# Patient Record
Sex: FEMALE | Race: WHITE
[De-identification: names, ages, dates, MRNs, and addresses within clinical notes are randomized per-mention and may not be internally consistent; named-entity substitution may affect disease eponyms.]

---

## 2017-02-10 ENCOUNTER — HOSPITAL ENCOUNTER (EMERGENCY)
Dept: HOSPITAL 74 - JERFT | Age: 27
Discharge: HOME | End: 2017-02-10
Payer: COMMERCIAL

## 2017-02-10 VITALS — DIASTOLIC BLOOD PRESSURE: 71 MMHG | HEART RATE: 75 BPM | SYSTOLIC BLOOD PRESSURE: 123 MMHG | TEMPERATURE: 98 F

## 2017-02-10 VITALS — BODY MASS INDEX: 21.7 KG/M2

## 2017-02-10 DIAGNOSIS — B97.89: ICD-10-CM

## 2017-02-10 DIAGNOSIS — J06.9: Primary | ICD-10-CM

## 2017-02-10 NOTE — PDOC
History of Present Illness





- General


Chief Complaint: Cold Symptoms


Stated Complaint: FEVER,CONGESTED,COUGHING,HEADACHES


Time Seen by Provider: 02/10/17 11:00


History Source: Patient


Exam Limitations: No Limitations





- History of Present Illness


Initial Comments: 





02/10/17 12:17


26 yr female with c/o runny nose, congestion, and had fever 3 days ago. No 

fever now.





Past History





- Past Medical History


Allergies/Adverse Reactions: 


 Allergies











Allergy/AdvReac Type Severity Reaction Status Date / Time


 


No Known Allergies Allergy   Verified 02/10/17 11:01











Home Medications: 


Ambulatory Orders





NK [No Known Home Medication]  02/10/17 








Other medical history: none





- Immunization History


Immunization Up to Date: Yes





- Psycho/Social/Smoking Cessation Hx


Anxiety: No


Suicidal Ideation: No


Smoking Status: No


Smoking History: Never smoked


Number of Cigarettes Smoked Daily: 0


Information on smoking cessation initiated: No


Hx Alcohol Use: No


Drug/Substance Use Hx: No


Substance Use Type: Alcohol





Respiratory Specific PMHX





- Complaint Specific PMHX


Angina: No


Bronchitis: No


Pneumonia: No


Pulmonary Embolus: No


TB (Tuberculosis): No





**Review of Systems





- Review of Systems


Able to Perform ROS?: Yes


Is the patient limited English proficient: No


Constitutional: Yes: Symptoms Reported, See HPI


HEENTM: Yes: Symptoms Reported, See HPI


Respiratory: Yes: See HPI, Cough





*Physical Exam





- Vital Signs


 Last Vital Signs











Temp Pulse Resp BP Pulse Ox


 


 98.0 F   75   18   123/71   100 


 


 02/10/17 10:41  02/10/17 10:41  02/10/17 10:41  02/10/17 10:41  02/10/17 10:41














- Physical Exam


General Appearance: Yes: Nourished, Appropriately Dressed


HEENT: positive: EOMI, RIGO, Pharynx Normal, Nasal Congestion, Rhinorrhea


Neck: negative: Tender


Respiratory/Chest: positive: Lungs Clear, Normal Breath Sounds.  negative: 

Chest Tender


Cardiovascular: positive: Regular Rhythm, Regular Rate


Gastrointestinal/Abdominal: positive: Normal Bowel Sounds, Soft


Musculoskeletal: positive: Normal Inspection


Extremity: positive: Normal Capillary Refill, Normal Inspection, Normal Range 

of Motion


Integumentary: positive: Normal Color, Dry, Warm


Neurologic: positive: Fully Oriented, Alert, Normal Mood/Affect, Normal Response

, Motor Strength 5/5





ED Treatment Course





- ADDITIONAL ORDERS


Additional order review: 














 02/10/17 11:41 Influenza Types A,B Antigen (DAVID) - Final





 Nasopharyngeal Swab  - Final


 


 02/10/17 11:41 Group A Strep Rapid Antigen - Final





 Throat 














Medical Decision Making





- Medical Decision Making





02/10/17 15:53


cc: runny nose, congestion 


sent by her job for flu test. pt works in .


pt denies body aches or chills, states she had fever 3 days ago





*DC/Admit/Observation/Transfer


Diagnosis at time of Disposition: 


 Upper respiratory infection, viral





- Discharge Dispostion


Disposition: HOME


Condition at time of disposition: Good





- Referrals


Referrals: 


Xavi Ferraro [Primary Care Provider] - 





- Patient Instructions


Additional Instructions: 


drink pleanty of fluids to stay hydrated


take over the counter Delsym or Mucinex for cough 


follow with your doctor if any worsening symptoms





- Post Discharge Activity


Work/School Note:  Back to Work

## 2017-05-29 ENCOUNTER — HOSPITAL ENCOUNTER (EMERGENCY)
Dept: HOSPITAL 74 - JERFT | Age: 27
Discharge: HOME | End: 2017-05-29
Payer: COMMERCIAL

## 2017-05-29 VITALS — DIASTOLIC BLOOD PRESSURE: 84 MMHG | TEMPERATURE: 98.2 F | HEART RATE: 57 BPM | SYSTOLIC BLOOD PRESSURE: 132 MMHG

## 2017-05-29 VITALS — BODY MASS INDEX: 21.7 KG/M2

## 2017-05-29 DIAGNOSIS — A69.0: Primary | ICD-10-CM

## 2017-05-29 NOTE — PDOC
History of Present Illness





- General


Chief Complaint: Pain


Stated Complaint: MOUTH SORE


Time Seen by Provider: 05/29/17 12:49


History Source: Patient


Exam Limitations: No Limitations





- History of Present Illness


Initial Comments: 





05/29/17 13:05


Patient is a 26-year-old female history of genital herpes, patient presents 

with canker to lower inner lip, patient also feels small bumps starting to 

erupt to upper lip.  Patient denies any fever, requesting medication.





Past Medical History: Denies.  





Allergies: No known allergies





Medications: none





Family History: Non-contributory





Social History: Denies smoking, alcohol use, or IVDU





Review of Systems


GENERAL/CONSTITUTIONAL: No fever or chills. No weakness. No weight change.


HEAD, EYES, EARS, NOSE AND THROAT: No change in vision. No ear pain or 

discharge. No sore throat. 


CARDIOVASCULAR: No chest pain or shortness of breath.


RESPIRATORY: No cough, wheezing, or hemoptysis.


GASTROINTESTINAL: No nausea, vomiting, diarrhea or constipation. No rectal 

bleeding.


GENITOURINARY: No dysuria, frequency, or change in urination.


MUSCULOSKELETAL: No joint or muscle swelling or pain. No neck or back pain.


SKIN : No rash or easy bruising. Small tiny macules to upper lip





Physical Exam: 


GENERAL: The patient is awake, alert, and fully oriented, in no acute distress.


EYES: Pupils equal, round and reactive to light, extraocular movements intact, 

sclera anicteric, conjunctiva clear.


ENT: Ears normal, nares patent, oropharynx clear without exudates.  Moist 

mucous membranes. No uvula deviation. Single canker noted to right internal 

lower lip. 


NECK: Normal range of motion, supple without lymphadenopathy, JVD, or masses.


LUNGS: Breath sounds equal, clear to auscultation bilaterally.  No wheezes, and 

no crackles.


HEART: Regular rate and rhythm, normal S1 and S2 without murmur, rub or gallop.


ABDOMEN: Soft, nontender, normoactive bowel sounds.  No guarding, no rebound.  

No masses. No bruising or abrasions


MUSCULOSKELETAL: Normal range of motion, no edema.  No clubbing or cyanosis. No 

cords, erythema, or tenderness.


NEUROLOGICAL: Cranial nerves II through XII grossly intact.  Normal speech, 

normal gait.


SKIN: Warm, Dry, normal turgor, no rashes or lesions noted. Small macules to 

upper lip





Past History





- Past Medical History


Allergies/Adverse Reactions: 


 Allergies











Allergy/AdvReac Type Severity Reaction Status Date / Time


 


No Known Allergies Allergy   Verified 05/29/17 12:01











Home Medications: 


Ambulatory Orders





Acyclovir [Zovirax -] 400 mg PO TID #30 capsule 05/29/17 








Other medical history: none





- Immunization History


Immunization Up to Date: Yes





- Psycho/Social/Smoking Cessation Hx


Anxiety: No


Suicidal Ideation: No


Smoking Status: No


Smoking History: Never smoked


Have you smoked in the past 12 months: No


Number of Cigarettes Smoked Daily: 0


Information on smoking cessation initiated: No


Hx Alcohol Use: No


Drug/Substance Use Hx: No


Substance Use Type: Alcohol





*Physical Exam





- Vital Signs


 Last Vital Signs











Temp Pulse Resp BP Pulse Ox


 


 98.2 F   57 L  18   132/84   100 


 


 05/29/17 12:02  05/29/17 12:02  05/29/17 12:02  05/29/17 12:02  05/29/17 12:02














Medical Decision Making





- Medical Decision Making





05/29/17 13:10


A/P: Patient with canker to oral cavity, history of genital herpes also history 

of oral herpetic lesions. We'll DC patient on azithromycin, should use over-the-

counter topical for pain relief such as Dilantin or breathing, she verbalized 

understanding will follow-up as directed.





*DC/Admit/Observation/Transfer


Diagnosis at time of Disposition: 


 Chancre





- Discharge Dispostion


Disposition: HOME


Condition at time of disposition: Good


Admit: No





- Prescriptions


Prescriptions: 


Acyclovir [Zovirax -] 400 mg PO TID #30 capsule





- Referrals


Referrals: 


Adalid Ferraro MD [Primary Care Provider] - 





- Patient Instructions


Additional Instructions: 


Good oral care.


Abreva, zilactin or over the counter chancre sore medicine.

## 2017-07-20 ENCOUNTER — HOSPITAL ENCOUNTER (EMERGENCY)
Dept: HOSPITAL 74 - JERFT | Age: 27
Discharge: HOME | End: 2017-07-20
Payer: COMMERCIAL

## 2017-07-20 VITALS — BODY MASS INDEX: 21 KG/M2

## 2017-07-20 VITALS — DIASTOLIC BLOOD PRESSURE: 80 MMHG | HEART RATE: 60 BPM | SYSTOLIC BLOOD PRESSURE: 110 MMHG | TEMPERATURE: 98 F

## 2017-07-20 DIAGNOSIS — J02.9: Primary | ICD-10-CM

## 2017-07-20 NOTE — PDOC
History of Present Illness





- General


Chief Complaint: Sore Throat


Stated Complaint: SORE THROAT


Time Seen by Provider: 07/20/17 16:26


History Source: Patient


Exam Limitations: No Limitations





- History of Present Illness


Initial Comments: 





07/20/17 16:43


26 yr female with c/o sore throat for 4-5 days. no fever or chills no cough no 

vomiting . no medical history or allergies. 





Past History





- Past Medical History


Allergies/Adverse Reactions: 


 Allergies











Allergy/AdvReac Type Severity Reaction Status Date / Time


 


No Known Allergies Allergy   Verified 07/20/17 16:23











Home Medications: 


Ambulatory Orders





NK [No Known Home Medication]  07/20/17 








Other medical history: denies





- Immunization History


Immunization Up to Date: Yes





- Psycho/Social/Smoking Cessation Hx


Anxiety: No


Suicidal Ideation: No


Smoking Status: No


Smoking History: Never smoked


Have you smoked in the past 12 months: No


Number of Cigarettes Smoked Daily: 0


Information on smoking cessation initiated: No


Hx Alcohol Use: No


Drug/Substance Use Hx: No


Substance Use Type: None





**Review of Systems





- Review of Systems


Able to Perform ROS?: Yes


Is the patient limited English proficient: No


Constitutional: No: Symptoms Reported


HEENTM: Yes: Symptoms Reported, See HPI


Respiratory: No: Symptoms reported


Cardiac (ROS): No: Symptoms Reported


ABD/GI: No: Symptoms Reported


: No: Symptoms Reported


Musculoskeletal: No: Symptoms Reported


Integumentary: No: Symptoms Reported


Neurological: No: Symptoms reported





*Physical Exam





- Vital Signs


 Last Vital Signs











Temp Pulse Resp BP Pulse Ox


 


 98 F   60   18   110/80   99 


 


 07/20/17 16:22  07/20/17 16:22  07/20/17 16:22  07/20/17 16:22  07/20/17 16:22














- Physical Exam


General Appearance: Yes: Nourished, Appropriately Dressed


HEENT: positive: EOMI, RIGO, TMs Normal, Pharyngeal Erythema.  negative: 

Tonsillar Exudate, Tonsillar Erythema


Neck: positive: Supple.  negative: Tender


Respiratory/Chest: positive: Lungs Clear, Normal Breath Sounds.  negative: 

Chest Tender


Cardiovascular: positive: Regular Rhythm, Regular Rate


Gastrointestinal/Abdominal: positive: Normal Bowel Sounds, Soft


Musculoskeletal: positive: Normal Inspection


Extremity: positive: Normal Inspection, Normal Range of Motion


Integumentary: positive: Normal Color, Dry, Warm


Neurologic: positive: Fully Oriented, Alert, Normal Mood/Affect, Normal Response

, Motor Strength 5/5





Medical Decision Making





- Medical Decision Making


07/20/17 17:08


cc: sore throat no fever


non toxic well appearing 


will r/o strep 


pt taking dayquil and nyquil as needed 





07/20/17 17:16








07/20/17 17:34


negative strep 








*DC/Admit/Observation/Transfer


Diagnosis at time of Disposition: 


Pharyngitis


Qualifiers:


 Pharyngitis/tonsillitis etiology: unspecified etiology Qualified Code(s): 

J02.9 - Acute pharyngitis, unspecified





- Discharge Dispostion


Disposition: HOME


Condition at time of disposition: Good





- Patient Instructions


Additional Instructions: 


gargle with warm salt water 4-5 times a day 


take motrin (over the counter advil, ibuprofen, motrin ) as needed for pain 


follow with your doctor if any worsening symptoms

## 2017-09-13 ENCOUNTER — HOSPITAL ENCOUNTER (EMERGENCY)
Dept: HOSPITAL 74 - JERFT | Age: 27
Discharge: HOME | End: 2017-09-13
Payer: COMMERCIAL

## 2017-09-13 VITALS — DIASTOLIC BLOOD PRESSURE: 74 MMHG | SYSTOLIC BLOOD PRESSURE: 141 MMHG | HEART RATE: 81 BPM | TEMPERATURE: 98.7 F

## 2017-09-13 VITALS — BODY MASS INDEX: 21 KG/M2

## 2017-09-13 DIAGNOSIS — J20.9: Primary | ICD-10-CM

## 2017-09-13 PROCEDURE — 3E0F7GC INTRODUCTION OF OTHER THERAPEUTIC SUBSTANCE INTO RESPIRATORY TRACT, VIA NATURAL OR ARTIFICIAL OPENING: ICD-10-PCS

## 2017-09-13 NOTE — PDOC
History of Present Illness





- General


Chief Complaint: Cold Symptoms


Stated Complaint: WHEEZING


Time Seen by Provider: 09/13/17 09:01


History Source: Patient


Exam Limitations: No Limitations





- History of Present Illness


Initial Comments: 





09/13/17 09:53


Patient is a 26-year-old female, no significant medical history currently on no 

medication reports with 2 week history of cough, wheezing, productive green 

sputum.  Patient states cough is worse at night.  Bronchospasm, no hemoptysis





Past Medical History: Asthma as a child.  





Allergies: No known allergies





Medications: None





Family History: Non-contributory





Social History: Denies smoking, alcohol use, or IVDU





Review of Systems


GENERAL/CONSTITUTIONAL: No fever or chills. No weakness. No weight change.


HEAD, EYES, EARS, NOSE AND THROAT: No change in vision. No ear pain or 

discharge. No sore throat. 


CARDIOVASCULAR: No chest pain or shortness of breath.


RESPIRATORY: Cough and wheezing no hemoptysis


GASTROINTESTINAL: No nausea, vomiting, diarrhea or constipation. No rectal 

bleeding.


GENITOURINARY: No dysuria, frequency, or change in urination.


MUSCULOSKELETAL: No joint or muscle swelling or pain. No neck or back pain.


SKIN : No rash or easy bruising.





Physical Exam: 


GENERAL: The patient is awake, alert, and fully oriented, in no acute distress.


EYES: Pupils equal, round and reactive to light, extraocular movements intact, 

sclera anicteric, conjunctiva clear.


ENT: Ears normal, nares patent, oropharynx clear without exudates.  Moist 

mucous membranes. No uvula deviation


NECK: Normal range of motion, supple without lymphadenopathy, JVD, or masses.


LUNGS: Breath sounds decreased at the bases, expiratory wheezing, no rhonchi.  

no crackles.


HEART: Regular rate and rhythm, normal S1 and S2 without murmur, rub or gallop.


ABDOMEN: Soft, nontender, normoactive bowel sounds.  No guarding, no rebound.  

No masses. No bruising or abrasions


MUSCULOSKELETAL: Normal range of motion, no edema.  No clubbing or cyanosis. No 

cords, erythema, or tenderness. No CVA Tenderness with fist.


SKIN: Warm, Dry, normal turgor, no rashes or lesions noted.





Past History





- Past Medical History


Allergies/Adverse Reactions: 


 Allergies











Allergy/AdvReac Type Severity Reaction Status Date / Time


 


No Known Allergies Allergy   Verified 09/13/17 08:54











Home Medications: 


Ambulatory Orders





Albuterol Sulfate Inhaler - [Ventolin HFA Inhaler -] 1 - 2 inh PO Q4H #1 

inhaler 09/13/17 


Azithromycin [Zithromax 250mg Tablets -] 250 mg PO UTDICT #6 tab 09/13/17 


Prednisone [Deltasone -] 20 mg PO DAILY #5 tablet 09/13/17 








Asthma: Yes (AS A CHILD)





- Immunization History


Immunization Up to Date: Yes





- Psycho/Social/Smoking Cessation Hx


Anxiety: No


Suicidal Ideation: No


Smoking Status: No


Smoking History: Never smoked


Have you smoked in the past 12 months: No


Number of Cigarettes Smoked Daily: 0


Hx Alcohol Use: Yes (SOCIAL)


Drug/Substance Use Hx: No


Substance Use Type: None





Respiratory Specific PMHX





- Complaint Specific PMHX


Angina: No


Bronchitis: No


Pneumonia: No


Pulmonary Embolus: No


TB (Tuberculosis): No





*Physical Exam





- Vital Signs


 Last Vital Signs











Temp Pulse Resp BP Pulse Ox


 


 98.7 F   81   20   141/74   98 


 


 09/13/17 08:52  09/13/17 08:52  09/13/17 08:52  09/13/17 08:52  09/13/17 08:52














ED Treatment Course





- Medications


Given in the ED: 


ED Medications














Discontinued Medications














Generic Name Dose Route Start Last Admin





  Trade Name Hajaq  PRN Reason Stop Dose Admin


 


Albuterol/Ipratropium  1 amp 09/13/17 09:20 09/13/17 09:46





  Duoneb -  NEB 09/13/17 09:21  1 amp





  ONCE ONE   Administration


 


Prednisone  40 mg 09/13/17 09:21 09/13/17 09:46





  Deltasone -  PO 09/13/17 09:22  40 mg





  ONCE ONE   Administration














Medical Decision Making





- Medical Decision Making





09/13/17 10:01


A/P: Patient here with URI for 2 weeks, bronchospasm, productive cough. 

Expiratory wheezes noted upon assessment with significant bronchospasm 

prednisone 40 mg by mouth and Combivent given with good result. We will DC 

patient on azithromycin, albuterol and prednisone due to longevity of illness 

and productive nature of cough.


Patient reassessed after Combivent treatment, lungs are clear patient with 

decreased bronchospasm.  O2 sats are 100%.





I discussed the physical exam findings, ancillary test results and final 

diagnoses with the patient. I answered all of the patient's questions.


The patient was satisfied with the care received and felt comfortable with the 

discharge plan and treatment plan. 


The patient will call  to arrange follow-up and will return to the Emergency 

Department with any new, persistent or worsening symptoms. 





*DC/Admit/Observation/Transfer


Diagnosis at time of Disposition: 


 Bronchitis





URI (upper respiratory infection)


Qualifiers:


 URI type: unspecified URI Qualified Code(s): J06.9 - Acute upper respiratory 

infection, unspecified





- Discharge Dispostion


Disposition: HOME


Condition at time of disposition: Good


Admit: No





- Prescriptions


Prescriptions: 


Prednisone [Deltasone -] 20 mg PO DAILY #5 tablet


Albuterol Sulfate Inhaler - [Ventolin HFA Inhaler -] 1 - 2 inh PO Q4H #1 inhaler


Azithromycin [Zithromax 250mg Tablets -] 250 mg PO UTDICT #6 tab





- Referrals


Referrals: 


Adalid Ferraro MD [Primary Care Provider] - 





- Patient Instructions


Printed Discharge Instructions:  DI for Acute Bronchitis


Additional Instructions: 


Keep head of bed elevated 45 when sleeping


Inhaler every 4 hours as needed


Cool air humidifier at night


Motrin for fever greater than 101


Followup in the primary care doctor's office in 2 days for evaluation.


If any respiratory distress, increased cough, inability to drink, increased 

wheezing please return immediately to emergency department.

## 2017-10-02 ENCOUNTER — HOSPITAL ENCOUNTER (EMERGENCY)
Dept: HOSPITAL 74 - JERFT | Age: 27
Discharge: HOME | End: 2017-10-02
Payer: COMMERCIAL

## 2017-10-02 VITALS — TEMPERATURE: 98.2 F | DIASTOLIC BLOOD PRESSURE: 87 MMHG | SYSTOLIC BLOOD PRESSURE: 130 MMHG | HEART RATE: 83 BPM

## 2017-10-02 VITALS — BODY MASS INDEX: 21.7 KG/M2

## 2017-10-02 DIAGNOSIS — N30.01: Primary | ICD-10-CM

## 2017-10-02 LAB
APPEARANCE UR: (no result)
BACTERIA #/AREA URNS HPF: (no result) /HPF
BILIRUB UR STRIP.AUTO-MCNC: NEGATIVE MG/DL
COLOR UR: YELLOW
KETONES UR QL STRIP: (no result)
LEUKOCYTE ESTERASE UR QL STRIP.AUTO: (no result)
MUCOUS THREADS URNS QL MICRO: (no result)
NITRITE UR QL STRIP: NEGATIVE
PH UR: 6 [PH] (ref 5–8)
PROT UR QL STRIP: (no result)
PROT UR QL STRIP: NEGATIVE
RBC # BLD AUTO: 11 /HPF (ref 0–3)
RBC # UR STRIP: NEGATIVE /UL
SP GR UR: 1.02 (ref 1–1.02)
UROBILINOGEN UR STRIP-MCNC: NEGATIVE MG/DL (ref 0.2–1)
WBC # UR AUTO: 10 /HPF (ref 3–5)

## 2017-10-02 NOTE — PDOC
History of Present Illness





- General


Chief Complaint: Urinary Problem


Stated Complaint: URINARY PROBLEM


Time Seen by Provider: 10/02/17 18:02


History Source: Patient


Exam Limitations: No Limitations





- History of Present Illness


Initial Comments: 


10/02/17 18:32


My chief complaint: Pain at end of urination, frequency, urgency last 3-4 days








History of present illness: Patient is a 26 year old female with history of 

asthma here today complaining of dysuria frequency urinating smaller amounts 

with urgency and tiny amount of blood noted on tissue when wiping last 3-4 

days. Patient denies any fever, nausea, vomiting, or any back pain or any 

abdominal pain except for suprapubic tenderness at end of urination. Patient 

denies any vaginal discharge.





10/02/17 18:36








10/02/17 18:36





Timing/Duration: getting worse, intermittent


Severity: mild


Associated Symptoms: reports: denies symptoms





Past History





- Past Medical History


Allergies/Adverse Reactions: 


 Allergies











Allergy/AdvReac Type Severity Reaction Status Date / Time


 


No Known Allergies Allergy   Verified 10/02/17 17:10











Home Medications: 


Ambulatory Orders





Phenazopyridine HCl [Pyridium] 200 mg PO TID #6 tablet 10/02/17 


Sulfamethoxazole/Trimethoprim [Bactrim Ds -] 1 tab PO BID #6 tablet 10/02/17 








Asthma: Yes (AS A CHILD)





- Immunization History


Immunization Up to Date: Yes





- Suicide/Smoking/Psychosocial Hx


Smoking Status: No


Smoking History: Never smoked


Have you smoked in the past 12 months: No


Number of Cigarettes Smoked Daily: 0


Information on smoking cessation initiated: No


Hx Alcohol Use: No


Drug/Substance Use Hx: No


Substance Use Type: None





**Review of Systems





- Review of Systems


Able to Perform ROS?: Yes


Constitutional: No: Symptoms Reported


HEENTM: No: Symptoms Reported


Respiratory: No: Symptoms reported


Cardiac (ROS): No: Symptoms Reported


ABD/GI: Yes: Symptoms Reported, Other (suprapubic tenderness at end of urination

)


: Yes: Symptoms Reported, Dysuria, Frequency, Hematuria (when wiping), 

Urgency.  No: Flank Pain


Musculoskeletal: No: Symptoms Reported


Integumentary: No: Symptoms Reported


Neurological: No: Symptoms reported





*Physical Exam





- Vital Signs


 Last Vital Signs











Temp Pulse Resp BP Pulse Ox


 


 98.2 F   83   14   130/87   100 


 


 10/02/17 17:10  10/02/17 17:10  10/02/17 17:10  10/02/17 17:10  10/02/17 17:10














- Physical Exam


General Appearance: Yes: Appropriately Dressed


Respiratory/Chest: positive: Lungs Clear, Normal Breath Sounds.  negative: 

Chest Tender, Respiratory Distress


Cardiovascular: positive: Regular Rhythm, Regular Rate, S1, S2


Gastrointestinal/Abdominal: positive: Normal Bowel Sounds, Soft.  negative: 

Tender, Organomegaly, Increased Bowel Sounds, Decreased BS, Distended, Guarding

, Rebound, Tenderness, Hepatomegaly, Spleenomegaly


Musculoskeletal: negative: CVA Tenderness, CVA Tenderness (R), CVA Tenderness (L

)


Integumentary: positive: Normal Color





ED Treatment Course





- ADDITIONAL ORDERS


Additional order review: 


 Laboratory  Results











  10/02/17





  17:30


 


Urine Color  Yellow


 


Urine Appearance  Cloudy


 


Urine pH  6.0


 


Urine Protein  1+ H


 


Urine Glucose (UA)  Negative


 


Urine Ketones  Trace H


 


Urine Blood  Negative


 


Urine Nitrite  Negative


 


Urine Bilirubin  Negative


 


Urine Urobilinogen  Negative


 


Urine RBC  11


 


Urine WBC  10


 


Ur Epithelial Cells  Many


 


Urine Bacteria  Rare


 


Urine Mucus  Few


 


Urine HCG, Qual  Negative














Medical Decision Making





- Medical Decision Making


10/02/17 18:36


Patient is a 26 year old female with history of asthma here today complaining 

of dysuria frequency urinating smaller amounts with urgency and tiny amount of 

blood noted on tissue when wiping last 3-4 days. Patient denies any fever, 

nausea, vomiting, or any back pain or any abdominal pain except for suprapubic 

tenderness at end of urination. Patient denies any vaginal discharge. She 

reports being sexually active with the same person does not want to be treated 

prophylactically for any possible STDs.








r/o UTI


cystitis 


r/o chlamydia/GC 














PLAN: 





pyridium 200 mg po tid for 2 days


bactrim DS 1 tab bid for 3 days 


urine hcg negative 


urinalysis 


urine C & S 


chlamydia/GC amplification 








 Laboratory Tests











  10/02/17





  17:30


 


Urine Color  Yellow


 


Urine Appearance  Cloudy


 


Urine pH  6.0


 


Ur Specific Gravity  Pending


 


Urine Protein  1+ H


 


Urine Glucose (UA)  Negative


 


Urine Ketones  Trace H


 


Urine Blood  Negative


 


Urine Nitrite  Negative


 


Urine Bilirubin  Negative


 


Urine Urobilinogen  Negative


 


Urine RBC  11


 


Urine WBC  10


 


Ur Epithelial Cells  Many


 


Urine Bacteria  Rare


 


Urine Mucus  Few


 


Urine HCG, Qual  Negative














10/02/17 18:38








*DC/Admit/Observation/Transfer


Diagnosis at time of Disposition: 


 Cystitis





- Discharge Dispostion


Disposition: HOME


Condition at time of disposition: Stable





- Patient Instructions


Additional Instructions: 








Follow-up with your primary care provider for repeat urine testing at end of 

treatment











Drink a lot a fluids especially cranberry juice








Return to emergency room if symptoms worsen any chills, nausea, vomiting, fever 

or back pain











Patient voiced understanding of discharge instructions and all questions were 

answered

## 2020-02-12 ENCOUNTER — HOSPITAL ENCOUNTER (EMERGENCY)
Dept: HOSPITAL 74 - JERFT | Age: 30
Discharge: HOME | End: 2020-02-12
Payer: COMMERCIAL

## 2020-02-12 VITALS — DIASTOLIC BLOOD PRESSURE: 81 MMHG | TEMPERATURE: 99.7 F | HEART RATE: 118 BPM | SYSTOLIC BLOOD PRESSURE: 131 MMHG

## 2020-02-12 VITALS — BODY MASS INDEX: 19.8 KG/M2

## 2020-02-12 DIAGNOSIS — B97.89: ICD-10-CM

## 2020-02-12 DIAGNOSIS — J45.909: ICD-10-CM

## 2020-02-12 DIAGNOSIS — J06.9: Primary | ICD-10-CM

## 2020-02-12 NOTE — PDOC
History of Present Illness





- General


Chief Complaint: Respiratory


Stated Complaint: Cold Symptoms


Time Seen by Provider: 02/12/20 12:21





- History of Present Illness


Initial Comments: 





02/12/20 12:31


29-year-old female presents for flulike symptoms x3 days





Past History





- Past Medical History


Allergies/Adverse Reactions: 


 Allergies











Allergy/AdvReac Type Severity Reaction Status Date / Time


 


No Known Allergies Allergy   Verified 02/12/20 12:12











Home Medications: 


Ambulatory Orders





Phenazopyridine HCl [Pyridium] 200 mg PO TID #6 tablet 10/02/17 


Sulfamethoxazole/Trimethoprim [Bactrim Ds -] 1 tab PO BID #6 tablet 10/02/17 








Asthma: Yes (AS A CHILD)


COPD: No





- Immunization History


Immunization Up to Date: Yes





- Psycho Social/Smoking Cessation Hx


Smoking Status: No


Smoking History: Never smoked


Have you smoked in the past 12 months: No


Number of Cigarettes Smoked Daily: 0


Hx Alcohol Use: No


Drug/Substance Use Hx: No


Substance Use Type: None





**Review of Systems





- Review of Systems


Constitutional: Yes: Chills, Fever, Malaise, Night Sweats


HEENTM: Yes: Nose Congestion, Throat Pain


Respiratory: Yes: Cough





*Physical Exam





- Vital Signs


 Last Vital Signs











Temp Pulse Resp BP Pulse Ox


 


 99.7 F H  118 H  16   131/81   96 


 


 02/12/20 12:09  02/12/20 12:09  02/12/20 12:09  02/12/20 12:09  02/12/20 12:09














- Physical Exam





02/12/20 12:31


GENERAL: The patient is awake, alert, and fully oriented, in no acute distress.


HEAD: Normal with no signs of trauma.


EYES:  sclera anicteric, conjunctiva clear.


ENT: Ears normal tympanic membranes normal oropharynx clear uvula midline


NECK: Normal range of motion


LUNGS: Breath sounds equal, clear to auscultation bilaterally.  No wheezes, and 

no crackles.


HEART: S1 and S2 without murmur, rub or gallop.


ABDOMEN: Soft, nontender, normoactive bowel sounds.  No guarding, no rebound.  

No masses.


EXTREMITIES: Normal range of motion, no edema.  No clubbing or cyanosis. No 

cords, erythema, or tenderness.


NEUROLOGICAL: Cranial nerves II through XII grossly intact.


PSYCH: Normal mood, normal affect.


SKIN: Warm, Dry, normal turgor, no rashes or lesions noted.





Medical Decision Making





- Medical Decision Making





02/12/20 12:31


Supportive care for presumptive influenza out of the window for Tamiflu





Discharge





- Discharge Information


Problems reviewed: Yes


Clinical Impression/Diagnosis: 


 Upper respiratory infection, viral





Condition: Stable


Disposition: HOME





- Admission


No





- Follow up/Referral


Referrals: 


Bert Muir MD [Staff Physician] - 





- Patient Discharge Instructions


Additional Instructions: 


Supportive care.  Maintain hydration with Pedialyte.  Tylenol and Motrin as 

directed for fever and body aches.  Return to the emergency room for worsening 

symptoms.  And without fail follow-up with your primary care physician in 1 to 

2 days for further evaluation and treatment options.





- Post Discharge Activity


Work/Back to School Note:  Back to Work, Back to School